# Patient Record
(demographics unavailable — no encounter records)

---

## 2025-03-03 NOTE — HISTORY OF PRESENT ILLNESS
[de-identified] : 03/03/25- RHD 67 yo female here for right elbow, started couple weeks ago injured elbow chest / rib area. stated Twing pulling sensation in elbow/ chest after workout. no numbness or tingling. bit of swelling. some warm sensation. took meloxicam for a week, no relief. pain extending arm out. little pain in rib with twisting.   PMH: Hyperlipidemia, Hypothyroidism Allergies: nkda

## 2025-03-03 NOTE — IMAGING
[de-identified] : right elbow: no swelling ttp over anconeus no ttp over radial tunnel/radial head or lateral epicondyle. ROM is full with 5/5 strength in all planes no ligamentous laxity.   TTP over the lower left rib (displaced?) there is no crepitus noted with deep breathing normal excursion with deep breathing.   3/3/2025: Right elbow 3 view xray: no acute changes in bony pathology  Left rib 3 view xray: fracture rib #10

## 2025-03-03 NOTE — ASSESSMENT
[FreeTextEntry1] : The patient was advised of the diagnosis. The natural history of the pathology was explained in full to the patient in layman's terms. All questions were answered. The risks and benefits of surgical and non-surgical treatment alternatives were explained in full to the patient. -right elbow anconeus strain -left rib fx  Naproxen rx NSAIDs recommended.  Patient warned of risk of NSAID medication to stomach and GI tract, risk of increase blood pressure, cardiac risk, and risk of fluid retention.  The patient should clear taking medication with internist/PMD if any problem with heart, blood pressure, or GI system exists.   Discussed possibility of MRI if no improvement.  rto in 4 weeks for f/u xray and examination

## 2025-03-18 NOTE — IMAGING
[de-identified] : right elbow: no swelling minimal ttp over anconeus moderate ttp over the medial flexor complex no ttp over radial tunnel/radial head or lateral epicondyle. ROM is full with 5/5 strength in all planes no ligamentous laxity.   TTP over the lower left rib (displaced?) there is no crepitus noted with deep breathing normal excursion with deep breathing.   3/3/2025: Right elbow 3 view xray: no acute changes in bony pathology  Left rib 3 view xray: fracture rib #10

## 2025-03-18 NOTE — ASSESSMENT
[FreeTextEntry1] : The patient was advised of the diagnosis. The natural history of the pathology was explained in full to the patient in layman's terms. All questions were answered. The risks and benefits of surgical and non-surgical treatment alternatives were explained in full to the patient. -right elbow anconeus strain -left rib fx -right medial epicondylitis   The patient was advised of the diagnosis. The natural history of the pathology was explained in full to the patient in layman's terms. All questions were answered. The risks and benefits of surgical and non-surgical treatment alternatives were explained in full to the patient. We discussed treatment options including nsaids, topical gels, tennis elbow strap, PT, activity modification, cortisone inj, sx .pt is aware that symptoms usually resolve on their own in 95-99% of people, but the timeframe is unknown. Home exercises/stretches were demonstrated and the patient practiced them as well- They are to do the exercises hourly and hold the stretch for 30 seconds. Avoid repetitive wrist flexion Tennis elbow strap will not help this patient.  Continue Naprosyn bid prn. Patient warned of risk of NSAID medication to stomach and GI tract, risk of increase blood pressure, cardiac risk, and risk of fluid retention.  The patient should clear taking medication with internist/PMD if any problem with heart, blood pressure, or GI system exists.   Pt will rto in 4 weeks for f/u care and left rib x-rays.

## 2025-03-18 NOTE — HISTORY OF PRESENT ILLNESS
[de-identified] : 03/18/2025: Pt still with continued right elbow pain despite rest and use of Naprosyn bid  03/03/25- RHD 65 yo female here for right elbow, started couple weeks ago injured elbow chest / rib area. stated Twing pulling sensation in elbow/ chest after workout. no numbness or tingling. bit of swelling. some warm sensation. took meloxicam for a week, no relief. pain extending arm out. little pain in rib with twisting.   PMH: Hyperlipidemia, Hypothyroidism Allergies: nkda

## 2025-04-29 NOTE — ASSESSMENT
[FreeTextEntry1] : The patient was advised of the diagnosis. The natural history of the pathology was explained in full to the patient in layman's terms. All questions were answered. The risks and benefits of surgical and non-surgical treatment alternatives were explained in full to the patient. -right elbow anconeus strain -left rib fx -right medial epicondylitis   The patient was advised of the diagnosis. The natural history of the pathology was explained in full to the patient in layman's terms. All questions were answered. The risks and benefits of surgical and non-surgical treatment alternatives were explained in full to the patient. We discussed treatment options including nsaids, topical gels, tennis elbow strap, PT, activity modification, cortisone inj, sx .pt is aware that symptoms usually resolve on their own in 95-99% of people, but the timeframe is unknown. Home exercises/stretches were demonstrated and the patient practiced them as well- They are to do the exercises hourly and hold the stretch for 30 seconds. Avoid repetitive wrist flexion Tennis elbow strap will not help this patient.  Continue Naprosyn bid prn. Patient warned of risk of NSAID medication to stomach and GI tract, risk of increase blood pressure, cardiac risk, and risk of fluid retention.  The patient should clear taking medication with internist/PMD if any problem with heart, blood pressure, or GI system exists.   Pt will rto in 6 weeks

## 2025-04-29 NOTE — IMAGING
[de-identified] : right elbow: no swelling moderate ttp over the medial flexor complex no ttp over radial tunnel/radial head or lateral epicondyle. ROM is full with 5/5 strength in all planes no ligamentous laxity.   no TTP over the lower left rib  there is no crepitus noted with deep breathing normal excursion with deep breathing.
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
